# Patient Record
Sex: MALE | Race: BLACK OR AFRICAN AMERICAN
[De-identification: names, ages, dates, MRNs, and addresses within clinical notes are randomized per-mention and may not be internally consistent; named-entity substitution may affect disease eponyms.]

---

## 2017-07-27 ENCOUNTER — HOSPITAL ENCOUNTER (EMERGENCY)
Dept: HOSPITAL 25 - ED | Age: 32
Discharge: HOME | End: 2017-07-27
Payer: SELF-PAY

## 2017-07-27 VITALS — SYSTOLIC BLOOD PRESSURE: 139 MMHG | DIASTOLIC BLOOD PRESSURE: 78 MMHG

## 2017-07-27 DIAGNOSIS — K08.89: Primary | ICD-10-CM

## 2017-07-27 PROCEDURE — 96372 THER/PROPH/DIAG INJ SC/IM: CPT

## 2017-07-27 PROCEDURE — 99281 EMR DPT VST MAYX REQ PHY/QHP: CPT

## 2017-07-27 NOTE — ED
Throat Pain/Nasal Congestion





- HPI Summary


HPI Summary: 


31M presents with dental pain for two weeks. on amoxicillin and taking viciodin 

by [facility]. He states the pain radiates to his neck.  He denies any 

difficulty swallowing, chest pain or SOB.  He states the pain is worst with 

eating cold and hot food and with air over the area.  He does not have a follow 

up appointment with the dentist.  He did not call the dentist. He denies any 

fever.  He denies any drainage from the area. 








- History of Current Complaint


Chief Complaint: EDDentalPain


Time Seen by Provider: 07/27/17 17:33





- Allergies/Home Medications


Allergies/Adverse Reactions: 


 Allergies











Allergy/AdvReac Type Severity Reaction Status Date / Time


 


No Known Allergies Allergy   Verified 07/27/17 16:22














PMH/Surg Hx/FS Hx/Imm Hx


Endocrine/Hematology History: 


   Denies: Hx Anticoagulant Therapy


Cardiovascular History: 


   Denies: Hx Hypertension


Infectious Disease History: No


Infectious Disease History: 


   Denies: Traveled Outside the [place] in Last 30 Days





- Family History


Known Family History: 


   Negative: Hypertension





- Social History


Alcohol Use: Rare


Substance Use Type: Reports: None


Smoking Status (MU): Current Every Day Smoker





Review of Systems


Negative: Fever


Positive: Dental Pain


Negative: Chest Pain


Negative: Shortness Of Breath


All Other Systems Reviewed And Are Negative: Yes





Physical Exam


Triage Information Reviewed: Yes


Vital Signs On Initial Exam: 


 Initial Vitals











Temp Pulse Resp BP Pulse Ox


 


 98.7 F   75   16   142/77   98 


 


 07/27/17 16:18  07/27/17 16:18  07/27/17 16:18  07/27/17 16:18  07/27/17 16:18











Vital Signs Reviewed: Yes


Appearance: Positive: Well-Appearing


Skin: Positive: Warm, Dry


Head/Face: Positive: Normal Head/Face Inspection


Eyes: Positive: Normal, Conjunctiva Clear


ENT: Positive: Normal ENT inspection, Pharynx normal, TMs normal, Other - no 

submandibular tenderness.  Negative: Trismus


Dental: Positive: Gross Decay/Caries @ - 32,31, Dental Fracture @ - 31.  

Negative: Abscess @, Bleeding


Neck: Positive: Supple, Nontender, No Lymphadenopathy


Respiratory/Lung Sounds: Positive: Clear to Auscultation, Breath Sounds Present


Cardiovascular: Positive: Normal, RRR





Diagnostics





- Vital Signs


 Vital Signs











  Temp Pulse Resp BP Pulse Ox


 


 07/27/17 16:18  98.7 F  75  16  142/77  98














- Laboratory


Lab Statement: Any lab studies that have been ordered have been reviewed, and 

results considered in the medical decision making process.





EENT Course/Dx





- Course


Course Of Treatment: 31M presents with dental pain for two weeks. on 

amoxicillin and taking viciodin by [facility]. He states the pain radiates to 

his neck.  He denies any difficulty swallowing, chest pain or SOB.  He states 

the pain is worst with eating cold and hot food and with air over the area.  He 

does not have a follow up appointment with the dentist.  He did not call the 

dentist. He denies any fever.  on exam no abscess seen. no submandular 

tenderness, no trismus. explained that likely has never pain as nerve root 

exposed in one of the back teeth and that is why is having referred pain. do 

not suspect ludwings or any systemic infection at this point. told to follow up 

with denist to have teeth removed. patient understands and agrees with plan.





- Differential Diagnoses


Differential Diagnoses: Dental Abscess, Dental Caries, Fractured Tooth





- Diagnoses


Provider Diagnoses: 


 Dental infection








Discharge





- Discharge Plan


Condition: Good


Disposition: HOME


Patient Education Materials:  Toothache (ED)


Referrals: 


[facility] [place] PHYSICIAN REFERRAL [Outside]


Additional Instructions: 


Continue antibiotics


Use ibuprofen every 6 hours and narcotic for break through pain at night as 

prescribed by dentist


Avoid hard, crunchy food until seen by dentist


Follow up with dentist as soon as possible


Return to ED if develop fever, shortness of breath, or any new or worsening 

symptoms


Establish care with primary care physician 








Images





- Images


Dental: 


  __________________________














  __________________________





 1 - pain, cavitites

## 2018-06-20 ENCOUNTER — HOSPITAL ENCOUNTER (EMERGENCY)
Dept: HOSPITAL 25 - UCEAST | Age: 33
Discharge: HOME | End: 2018-06-20
Payer: SELF-PAY

## 2018-06-20 VITALS — DIASTOLIC BLOOD PRESSURE: 83 MMHG | SYSTOLIC BLOOD PRESSURE: 145 MMHG

## 2018-06-20 DIAGNOSIS — F17.210: ICD-10-CM

## 2018-06-20 DIAGNOSIS — Z87.2: ICD-10-CM

## 2018-06-20 DIAGNOSIS — L03.317: Primary | ICD-10-CM

## 2018-06-20 PROCEDURE — G0463 HOSPITAL OUTPT CLINIC VISIT: HCPCS

## 2018-06-20 PROCEDURE — 99212 OFFICE O/P EST SF 10 MIN: CPT

## 2018-06-20 NOTE — UC
Valdemar BRITO Jade, scribed for Ramiro Darling MD on 06/20/18 at 1929 .





Skin Complaint HPI





- HPI Summary


HPI Summary: 


Pt is a 33 y/o male who presents to [facility] c/o a cyst. He states the cyst on his 

tailbone has been hurting for 3 days, and he has a PMHx of pilonidal cysts. 

Pain is rated 10/10 in severity, and is described as throbbing. In the past, 

his cysts have not been needed to be drained and antibiotics cured them. He 

states he has not been feeling sick. No PMHx MRSA.








- History of Current Complaint


Time Seen by Provider: 06/20/18 19:10


Stated Complaint: TAILBONE PAIN


Hx Obtained From: Patient


Onset/Duration: Gradual Onset, Lasting Days - 3, Still Present


Current Severity: Severe


Pain Intensity: 10


Pain Scale Used: 0-10 Numeric


Location: Other - Tailbone


Character: Pain, Redness


Aggravating Factor(s): Touch, Other - Sitting down


Alleviating Factor(s): Nothing





- Allergy/Home Medications


Allergies/Adverse Reactions: 


 Allergies











Allergy/AdvReac Type Severity Reaction Status Date / Time


 


No Known Allergies Allergy   Verified 06/20/18 19:12














Review of Systems


Constitutional: Fever - Mild


Skin: Other - Cyst on tailbone, pain


All Other Systems Reviewed And Are Negative: Yes





PMH/Surg Hx/FS Hx/Imm Hx





- Additional Past Medical History


Additional PMH: 


Pilonidal cysts


Endocrine History: Other - NEGATIVE: anticoagulant therapy


Other Endocrine History: .


Cardiovascular History: Other - NEGATIVE: HTN


Other Cardiovascular History: .


Other History Of: 


   Negative For: Anticoagulant Therapy





- Family History


Known Family History: 


   Negative: Hypertension





- Social History


Alcohol Use: Rare


Substance Use Type: None


Smoking Status (MU): Current Every Day Smoker


Household Exposure Type: Cigarettes





Physical Exam





- Summary


Physical Exam Summary: 


General: well-appearing, no pain distress


Skin: warm, color reflects adequate perfusion, dry. 7 cm by 4 cm cellulitis on 

the left side of the luteal cleft, erythematous, tender to palpation. No 

palpable fluid collection for drainage.


Head: normal


Eyes: EOMI, CAT


ENT: normal


Neck: supple, nontender


Respiratory: CTA, breath sounds present


Cardiovascular: RRR


Abdomen: soft, nontender


Bowel: present


Musculoskeletal: normal, strength/ROM intact


Neurological: sensory/motor intact, A&O x3


Psychological: affect/mood appropriate





Triage Information Reviewed: Yes


Vital Signs: 


 Initial Vital Signs











Temp  100.9 F   06/20/18 19:12


 


Pulse  99   06/20/18 19:12


 


Resp  20   06/20/18 19:12


 


BP  145/83   06/20/18 19:12


 


Pulse Ox  98   06/20/18 19:12











Vital Signs Reviewed: Yes





Course/Dx





- Course


Course Of Treatment: THERE WAS NO DRAINABLE ABSCESS ON EXAM.  F/U PMD.  WE 

DISCUSSED GETTING RECHECK SOONER IF WORSE.





- Diagnoses


Provider Diagnoses: LEFT GLUTEAL Cellulitis





Discharge





- Sign-Out/Discharge


Documenting (check all that apply): Discharge/Admit/Transfer - Discharge





- Discharge Plan


Condition: Stable


Disposition: HOME


Prescriptions: 


Clindamycin Cap(NF) [Clindamycin Cap 300 mg Cap(NF)] 300 mg PO Q6H #40 cap


oxyCODONE/Acetamin 5/325 MG* [Percocet 5/325 TAB*] 1 tab PO Q4H PRN #20 tab MDD 

6


 PRN Reason: Pain


Patient Education Materials:  Cellulitis (ED)


[place]errals[place] 


[facility] [place]


No Primary Care Phys,NOPCP [Primary Care Provider] - 


Additional Instructions: 


FOLLOW UP WITH YOUR DOCTOR.


GET RECHECKED FOR ANY WORSENING OF YOUR CONDITION OR QUESTIONS OR CONCERNS.





- Billing Disposition and Condition


Condition: STABLE


Disposition: Home





The documentation as recorded by the Valdemar garvin Jade accurately reflects the 

service I personally performed and the decisions made by me, Ramiro Darling MD.

## 2018-06-24 ENCOUNTER — HOSPITAL ENCOUNTER (EMERGENCY)
Dept: HOSPITAL 25 - UCEAST | Age: 33
Discharge: HOME | End: 2018-06-24
Payer: SELF-PAY

## 2018-06-24 VITALS — DIASTOLIC BLOOD PRESSURE: 87 MMHG | SYSTOLIC BLOOD PRESSURE: 162 MMHG

## 2018-06-24 DIAGNOSIS — L02.31: Primary | ICD-10-CM

## 2018-06-24 DIAGNOSIS — F17.210: ICD-10-CM

## 2018-06-24 PROCEDURE — G0463 HOSPITAL OUTPT CLINIC VISIT: HCPCS

## 2018-06-24 PROCEDURE — 10060 I&D ABSCESS SIMPLE/SINGLE: CPT

## 2018-06-24 PROCEDURE — 10080 I&D PILONIDAL CYST SIMPLE: CPT

## 2018-06-24 PROCEDURE — 99212 OFFICE O/P EST SF 10 MIN: CPT

## 2020-12-18 NOTE — UC
Skin Complaint HPI





- HPI Summary


HPI Summary: 





31 yo male presents with abscess vs cyst to upper buttocks. He tells me that he 

was seen here a few days ago and placed on antibiotics. He is here today 

because he thinks it is ready to drain. Denies fever, chills.





- History of Current Complaint


Hx Obtained From: Patient


Onset/Duration: Gradual Onset


Timing: Constant


Onset Severity: Severe


Current Severity: Severe


Pain Intensity: 10


Pain Scale Used: 0-10 Numeric





<Rivera Mckee - Last Filed: 06/24/18 09:33>





<Osmar Ugarte - Last Filed: 06/24/18 13:48>





- History of Current Complaint


Time Seen by Provider: 06/24/18 08:56


Stated Complaint: WOUND RECHECK





- Allergy/Home Medications


Allergies/Adverse Reactions: 


 Allergies











Allergy/AdvReac Type Severity Reaction Status Date / Time


 


No Known Allergies Allergy   Verified 06/24/18 09:00














Review of Systems


Constitutional: Negative


Skin: Other - Abscess upper buttocks


Respiratory: Negative


Cardiovascular: Negative


Gastrointestinal: Negative


Neurovascular: Negative


Neurological: Negative


Psychological: Negative


All Other Systems Reviewed And Are Negative: Yes





<Rivera Mckee - Last Filed: 06/24/18 09:33>





PMH/Surg Hx/FS Hx/Imm Hx





- Additional Past Medical History


Additional PMH: 





None


Previously Healthy: Yes


Other History Of: 


   Negative For: Anticoagulant Therapy





- Surgical History


Surgical History: None





- Family History


Known Family History: Positive: None


   Negative: Hypertension





- Social History


Lives: With Family


Alcohol Use: Rare


Substance Use Type: None


Smoking Status (MU): Current Every Day Smoker


Household Exposure Type: Cigarettes





<Rivera Mckee - Last Filed: 06/24/18 09:33>





Physical Exam





- Summary


Physical Exam Summary: 





GENERAL: NAD. WDWN. No pain distress.


SKIN: Superior intergluteal cleft with 3.0cm abscess and surrounding 5.0cm 

erythema and significant tenderness. No streaking, bleeding, or drainage.


NECK: Supple. Nontender. No lymphadenopathy. 


CHEST:  No accessory muscle use. Breathing comfortably and in no distress.


CV:  RRR. Without m/r/g.


NEURO: Alert. CN II-XII grossly intact.


PSYCH: Age appropriate behavior.





Triage Information Reviewed: Yes


Vital Signs: 





Vital Signs:











Temp Pulse Resp BP Pulse Ox


 


 99 F   80   18   162/87   99 


 


 06/24/18 08:55  06/24/18 08:55  06/24/18 08:55  06/24/18 08:55  06/24/18 08:55











Vital Signs Reviewed: Yes





<RafiRivera Russell Last Filed: 06/24/18 09:33>


Vital Signs: 


 Initial Vital Signs











Temp  99 F   06/24/18 08:55


 


Pulse  80   06/24/18 08:55


 


Resp  18   06/24/18 08:55


 


BP  162/87   06/24/18 08:55


 


Pulse Ox  99   06/24/18 08:55














<Osmar Ugarte - Last Filed: 06/24/18 13:48>





Course/Dx





- Course


Course Of Treatment: A time out was performed, witnessed, and signed. 2mL of 2% 

lidocaine without epi was administered and good anesthetization was achieved. 

The abscess was incised with a #11 blade and copious purulent matter was able 

to be expressed.  iStop checked and ok





- Diagnoses


Provider Diagnoses: Abscess buttocks





<Mathew Mckeeothy Drew Dwayne Filed: 06/24/18 09:33>





Procedures





- Incision and Drainage


  ** Left Posterior Buttocks


Anesthesia: Local, Lidocaine


Instrument(s): Scalpel





<RafiRivera Rice Filed: 06/24/18 09:33>





Discharge





- Sign-Out/Discharge


Documenting (check all that apply): Discharge/Admit/Transfer





- Billing Disposition and Condition


Condition: STABLE


Disposition: Home





<Rivera Mckee Dwayne Filed: 06/24/18 09:33>





- Billing Disposition and Condition


Condition: STABLE


Disposition: Home





<Osmar Ugarte Last Filed: 06/24/18 13:48>





- Discharge Plan


Condition: Stable


Disposition: HOME


Prescriptions: 


oxyCODONE/Acetamin 5/325 MG* [Percocet 5/325 TAB*] 1 tab PO Q6H PRN #16 tab MDD 

4


 PRN Reason: Pain


Patient Education Materials:  Pilonidal Cyst (ED)


Referrals: 


No Primary Care Phys,NOPCP [Primary Care Provider] - 


Ernesto Rose MD [Medical Doctor] - If Needed


Additional Instructions: 


If you develop a fever, shortness of breath, chest pain, new or worsening 

symptoms - please call your PCP or go to the ED.


 





Your blood pressure was high at todays visit. Please see your primary provider 

within 4 weeks for recheck and re-evaluation.








1) If your cyst returns, please call the surgeon at the number below to 

schedule a follow up appointment for removal


2) Keep taking your antibiotic








Per institutional requirements, I have reviewed the chart, however, I was not 

consulted specifically or made aware of this patient by the above midlevel 

provider.  I did not personally evaluate, interact with , or disposition  this 

patient.
none